# Patient Record
Sex: MALE | Race: BLACK OR AFRICAN AMERICAN | NOT HISPANIC OR LATINO | URBAN - METROPOLITAN AREA
[De-identification: names, ages, dates, MRNs, and addresses within clinical notes are randomized per-mention and may not be internally consistent; named-entity substitution may affect disease eponyms.]

---

## 2020-06-03 NOTE — PATIENT DISCUSSION
After informed consent the patient received 0.2 cc of Kenalog 10mg/ml to the left lower eyelid for management of hypertrophic scarring status post Moh's Repair.

## 2020-09-09 ENCOUNTER — IMPORTED ENCOUNTER (OUTPATIENT)
Dept: URBAN - METROPOLITAN AREA CLINIC 1 | Facility: CLINIC | Age: 60
End: 2020-09-09

## 2020-09-09 PROBLEM — E11.9: Noted: 2020-09-09

## 2020-09-09 PROBLEM — H04.123: Noted: 2020-09-09

## 2020-09-09 PROBLEM — Z79.84: Noted: 2020-09-09

## 2020-09-09 PROBLEM — H40.023: Noted: 2020-09-09

## 2020-09-09 PROBLEM — H25.813: Noted: 2020-09-09

## 2020-09-09 PROCEDURE — 92015 DETERMINE REFRACTIVE STATE: CPT

## 2020-09-09 PROCEDURE — 92250 FUNDUS PHOTOGRAPHY W/I&R: CPT

## 2020-09-09 PROCEDURE — 92004 COMPRE OPH EXAM NEW PT 1/>: CPT

## 2020-09-09 NOTE — PATIENT DISCUSSION
1.  DM Type II (Oral Meds) -- without sign of diabetic retinopathy and no blot heme on dilated retinal examination today OU No Macular Edema -- Discussed the pathophysiology of diabetes and its effect on the eye and risk of blindness. Stressed the importance of strong glucose control. Advised of importance of at least yearly dilated examinations but to contact us immediately for any problems or concerns. 2. Glaucoma Suspect OU -- (C/D: 0.55/0.65) IOP: 18/17. AA. Unknown Family Hx. Mac Photo today shows Cupping OU. Patient is considered high risk. Condition was discussed with patient and patient understands. Will continue to monitor patient for any progression in condition. Patient was advised to call us with any problems questions or concerns. 3.  Cataract OU -- Observe for now without intervention. The patient was advised to contact us if any change or worsening of vision4. Dry Eyes OU -- Begin the use of ATs BID OU routinely (Sample of given). MRX for glasses given. Letter to PCP. Return for an appointment in 1 month 10/OCT/VF(24-2) with Dr. Darek Altman.

## 2020-09-09 NOTE — PATIENT DISCUSSION
Dry Eyes OU -- Begin the use of ATs BID OU routinely (Sample of given). MRX for glasses given. Letter to PCP.

## 2021-01-27 ENCOUNTER — IMPORTED ENCOUNTER (OUTPATIENT)
Dept: URBAN - METROPOLITAN AREA CLINIC 1 | Facility: CLINIC | Age: 61
End: 2021-01-27

## 2021-01-27 PROBLEM — H40.023: Noted: 2021-01-27

## 2021-01-27 PROCEDURE — 92083 EXTENDED VISUAL FIELD XM: CPT

## 2021-01-27 PROCEDURE — 99213 OFFICE O/P EST LOW 20 MIN: CPT

## 2021-01-27 PROCEDURE — 92133 CPTRZD OPH DX IMG PST SGM ON: CPT

## 2021-01-27 NOTE — PATIENT DISCUSSION
1.  Glaucoma Suspect OU -- (C/D: 0.55/0.65) IOP stable at 17 OU today. T-Max 18/17. Unknown Family Hx. AA.OCT shows OD WNL OS - Minimal thinning & Temporal decrease. VF shows high false positives & non-specific loss OD and possible early superior arcuate OS. ***Treat with any future progression*** Patient is considered high risk. Condition was discussed with patient and patient understands. 2.  Cataract OU -- Observe. 3. Dry Eyes OU -- Cont the use of ATs BID OU routinely. 4. H/o DM Type II (Oral Meds) w/o DME/DRReturn for an appointment in 1 month 10/OCT/VF(24-2) with Dr. Juliane Streeter.

## 2022-03-07 ENCOUNTER — COMPREHENSIVE EXAM (OUTPATIENT)
Dept: URBAN - METROPOLITAN AREA CLINIC 1 | Facility: CLINIC | Age: 62
End: 2022-03-07

## 2022-03-07 DIAGNOSIS — H04.123: ICD-10-CM

## 2022-03-07 DIAGNOSIS — E11.9: ICD-10-CM

## 2022-03-07 DIAGNOSIS — H11.153: ICD-10-CM

## 2022-03-07 DIAGNOSIS — H40.023: ICD-10-CM

## 2022-03-07 DIAGNOSIS — H25.813: ICD-10-CM

## 2022-03-07 PROCEDURE — 92133 CPTRZD OPH DX IMG PST SGM ON: CPT

## 2022-03-07 PROCEDURE — 92014 COMPRE OPH EXAM EST PT 1/>: CPT

## 2022-03-07 PROCEDURE — 92015 DETERMINE REFRACTIVE STATE: CPT

## 2022-03-07 ASSESSMENT — VISUAL ACUITY
OD_CC: J1
OD_BAT: 20/40
OS_CC: J1
OS_SC: 20/50
OD_CC: 20/25
OD_SC: 20/50
OS_CC: 20/25
OS_BAT: 20/40

## 2022-03-07 ASSESSMENT — TONOMETRY
OS_IOP_MMHG: 18
OD_IOP_MMHG: 18

## 2022-03-07 NOTE — PATIENT DISCUSSION
(CD 0.55/0.65) No progression by OCT. IOP stable. T-Max 18/17. Unknown Family Hx. +AA. Patient is considered high risk. Condition was discussed with patient and patient understands. Will continue to monitor patient for any progression in condition. Patient was advised to call us with any problems, questions, or concerns.

## 2022-04-02 ASSESSMENT — VISUAL ACUITY
OD_CC: 20/40
OS_SC: J4
OD_SC: 20/20
OD_SC: J4
OS_SC: 20/20
OD_CC: J1+
OS_CC: 20/40
OS_CC: J1+

## 2022-04-02 ASSESSMENT — TONOMETRY
OD_IOP_MMHG: 17
OS_IOP_MMHG: 17
OS_IOP_MMHG: 17
OD_IOP_MMHG: 18

## 2025-03-27 NOTE — PATIENT DISCUSSION
"The patient is given the patient education document:  ""Eyelid Disorders""
Also, please do not hesitate to call us if you have any concerns not addressed by this information. Please call [phone] and we will do everything we can to help you during this period.
Cosmetic/Reconstructive
EYELID SURGERY POST OP CARE: (BLUE SHEET)
Eyelid &amp; Orbital Surgery
[facility]/amb
General:
POSTOP LIDS
POSTOPERATIVE CARE
Patient Education:
Patient is here for suture removal.  The patient has a normal amount of brusing and swelling consistent with the post operative course. The suture lines are intact, there is no evidence of infection. The plan is to remove the sutures today with an expectation of normal healing.   The post op course has been further reviewed with the patient
Please start to use a hot compress two to three times daily for five to ten minutes each time. This involves using a clean washcloth and placing it under hot tap water. Squeeze out the washcloth until it is moist and hot and place it over the surgical area for five to 10 minutes. The washcloth may cool off during this period and you will need to repeatedly place it under the hot water. I would like you to do this for at least one month. This will help reduce further swelling and bruising.
Resume normal activity. Resume any medications that were discontinued for surgery. Stop cold compresses Hot compresses to affected lid(s) 2-3 times daily for one month, 5 minutes at a time. Artificial tears prn burning/itching/blurry vision. Wash incisions/ lash line once daily with baby shampoo.
Rory Bansal M.D. and Vannessa Rollins P.A.-C
Sutures removed today without difficulty
With best regards,
You are now at the one to two week postoperative point from your surgery. You may have residual bruising and swelling, which is to be expected at this time. We recommend stopping cold compresses.
You do not need to be on any antibiotics at this time if you have finished your oral antibiotics given to you at the time of your surgery.
Awake